# Patient Record
Sex: FEMALE | Race: BLACK OR AFRICAN AMERICAN | NOT HISPANIC OR LATINO | URBAN - METROPOLITAN AREA
[De-identification: names, ages, dates, MRNs, and addresses within clinical notes are randomized per-mention and may not be internally consistent; named-entity substitution may affect disease eponyms.]

---

## 2020-08-19 ENCOUNTER — OFFICE (OUTPATIENT)
Dept: URBAN - METROPOLITAN AREA CLINIC 11 | Facility: CLINIC | Age: 40
End: 2020-08-19
Payer: COMMERCIAL

## 2020-08-19 VITALS
OXYGEN SATURATION: 91 % | HEART RATE: 102 BPM | HEIGHT: 69 IN | WEIGHT: 179 LBS | SYSTOLIC BLOOD PRESSURE: 138 MMHG | DIASTOLIC BLOOD PRESSURE: 73 MMHG

## 2020-08-19 DIAGNOSIS — K74.60 UNSPECIFIED CIRRHOSIS OF LIVER: ICD-10-CM

## 2020-08-19 DIAGNOSIS — R17 UNSPECIFIED JAUNDICE: ICD-10-CM

## 2020-08-19 DIAGNOSIS — R10.84 GENERALIZED ABDOMINAL PAIN: ICD-10-CM

## 2020-08-19 DIAGNOSIS — R14.0 ABDOMINAL DISTENSION (GASEOUS): ICD-10-CM

## 2020-08-19 DIAGNOSIS — R18.8 OTHER ASCITES: ICD-10-CM

## 2020-08-19 LAB
AFP, SERUM, TUMOR MARKER: 13.6 NG/ML — HIGH (ref 0–8.3)
CBC, PLATELET, NO DIFFERENTIAL: HEMATOCRIT: 25 % — LOW (ref 34–46.6)
CBC, PLATELET, NO DIFFERENTIAL: HEMOGLOBIN: 9 G/DL — LOW (ref 11.1–15.9)
CBC, PLATELET, NO DIFFERENTIAL: MCH: 37.8 PG — HIGH (ref 26.6–33)
CBC, PLATELET, NO DIFFERENTIAL: MCHC: 36 G/DL — HIGH (ref 31.5–35.7)
CBC, PLATELET, NO DIFFERENTIAL: MCV: 105 FL — HIGH (ref 79–97)
CBC, PLATELET, NO DIFFERENTIAL: PLATELETS: 145 X10E3/UL — LOW (ref 150–450)
CBC, PLATELET, NO DIFFERENTIAL: RBC: 2.38 X10E6/UL — CRITICAL LOW (ref 3.77–5.28)
CBC, PLATELET, NO DIFFERENTIAL: RDW: 13.1 % (ref 11.7–15.4)
CBC, PLATELET, NO DIFFERENTIAL: WBC: 9.1 X10E3/UL (ref 3.4–10.8)
COMP. METABOLIC PANEL (14): A/G RATIO: 0.6 — LOW (ref 1.2–2.2)
COMP. METABOLIC PANEL (14): ALBUMIN: 2.8 G/DL — LOW (ref 3.8–4.8)
COMP. METABOLIC PANEL (14): ALKALINE PHOSPHATASE: 172 IU/L — HIGH (ref 39–117)
COMP. METABOLIC PANEL (14): ALT (SGPT): 82 IU/L — HIGH (ref 0–32)
COMP. METABOLIC PANEL (14): AST (SGOT): 220 IU/L — HIGH (ref 0–40)
COMP. METABOLIC PANEL (14): BILIRUBIN, TOTAL: 16.6 MG/DL — CRITICAL HIGH (ref 0–1.2)
COMP. METABOLIC PANEL (14): BUN/CREATININE RATIO: 21 (ref 9–23)
COMP. METABOLIC PANEL (14): BUN: 24 MG/DL (ref 6–24)
COMP. METABOLIC PANEL (14): CALCIUM: 9 MG/DL (ref 8.7–10.2)
COMP. METABOLIC PANEL (14): CARBON DIOXIDE, TOTAL: 17 MMOL/L — LOW (ref 20–29)
COMP. METABOLIC PANEL (14): CHLORIDE: 101 MMOL/L (ref 96–106)
COMP. METABOLIC PANEL (14): CREATININE: 1.15 MG/DL — HIGH (ref 0.57–1)
COMP. METABOLIC PANEL (14): EGFR IF AFRICN AM: 68 ML/MIN/1.73 (ref 59–?)
COMP. METABOLIC PANEL (14): EGFR IF NONAFRICN AM: 59 ML/MIN/1.73 — LOW (ref 59–?)
COMP. METABOLIC PANEL (14): GLOBULIN, TOTAL: 4.7 G/DL — HIGH (ref 1.5–4.5)
COMP. METABOLIC PANEL (14): GLUCOSE: 101 MG/DL — HIGH (ref 65–99)
COMP. METABOLIC PANEL (14): POTASSIUM: 4.3 MMOL/L (ref 3.5–5.2)
COMP. METABOLIC PANEL (14): PROTEIN, TOTAL: 7.5 G/DL (ref 6–8.5)
COMP. METABOLIC PANEL (14): SODIUM: 135 MMOL/L (ref 134–144)
PROTHROMBIN TIME (PT): INR: 1.5 — HIGH (ref 0.8–1.2)
PROTHROMBIN TIME (PT): PROTHROMBIN TIME: 15.9 SEC — HIGH (ref 9.1–12)

## 2020-08-19 PROCEDURE — 99204 OFFICE O/P NEW MOD 45 MIN: CPT | Performed by: INTERNAL MEDICINE

## 2020-08-19 NOTE — SERVICEHPINOTES
Ms. Arroyo is a 41-year-old female here for evaluation of decompensated cirrhosis. Patient states that she was just recently in [facility] and has been previously followed by the [facility] GI group because of her liver disease. She was told that her liver disease was related to extensive alcohol use. She apparently was in the hospital in October 2019 and at that time had an EGD by Dr. Montanez which only revealed Candida esophagitis and portal hypertensive gastropathy with no varices. Stomach biopsies were unremarkable. She also had an EGD in 2016 which revealed some esophagitis and gastric ulcer. Biopsies of the ulcer were unremarkable. She admits to drinking heavily up until the end of July 2020 and presented to the hospital because of increased abdominal distention, abdominal pain, and jaundice. We do not have records at this time. She states that she was started on Bumex in the hospital and also given spironolactone which she was already taking. She denied any evidence of any bleeding. She did have some mild confusion and elevated ammonia and was given lactulose. She also states they check for ascites but there is not enough ascites for paracentesis to be performed. She denies being given any prednisone. She states that since her hospitalization she has been doing about the same. She continues to have some generalized abdominal discomfort. She denies any problems with confusion. She denies any fevers or chills. She continues to have jaundice. She states that she has some leg swelling as well. She has never been evaluated for liver transplant and states that she has drank heavily off and on with her last use of alcohol last week of July 2020.

## 2020-08-19 NOTE — SERVICENOTES
I had a long discussion with the patient in regard to her liver cirrhosis and likely alcohol hepatitis.  I explained to her that she will likely have further decompensation which may lead to her death if she continues drink alcohol.  The patient agrees and states that she plans to no longer drink any alcohol.  She is interested in transplant hepatology evaluation, however I told her that she would not be a candidate at the time being but hopefully by the time she gets in to see them she will have remained sober.  She does not appear to have any evidence of any significant ascites at this time.  We will continue her current diuretics and check blood work.  If she does have worsening of her labs or evidence of further decompensation I do recommend she go back to the emergency department.

ADDENDUM: I was able to review some records from [facility].  She was admitted at the end of July and discharged on August 5th.  At that time her meld was 28 due to an INR 1.8, sodium 134, bilirubin 23.8.  Her albumin was low at 2.2.  AST was 127. ALT 51. Alk-phos 177. Imaging in the hospital did reveal normal abdominal ultrasound with to little fluid for paracentesis.  Her ammonia was low.  There was evidence of some gallbladder sludge and stones with some mild gallbladder distention but overall findings were nonspecific given her cirrhosis.  Of note, she was not having any right upper quadrant pain during our evaluation.  CT scan of the abdomen and pelvis with contrast did reveal some mild colonic wall thickening likely related to her cirrhosis.  She is not having any diarrhea.  No other procedures performed during this hospitalization.  We will need to obtain further older records to determine her cirrhosis evaluation and hepatitis evaluation as well.

## 2020-08-27 ENCOUNTER — OFFICE (OUTPATIENT)
Dept: URBAN - METROPOLITAN AREA CLINIC 11 | Facility: CLINIC | Age: 40
End: 2020-08-27

## 2020-09-08 ENCOUNTER — OFFICE (OUTPATIENT)
Dept: URBAN - METROPOLITAN AREA CLINIC 11 | Facility: CLINIC | Age: 40
End: 2020-09-08

## 2020-10-28 ENCOUNTER — OFFICE (OUTPATIENT)
Dept: URBAN - METROPOLITAN AREA CLINIC 11 | Facility: CLINIC | Age: 40
End: 2020-10-28
Payer: COMMERCIAL

## 2020-10-28 VITALS
DIASTOLIC BLOOD PRESSURE: 66 MMHG | WEIGHT: 189 LBS | HEIGHT: 69 IN | OXYGEN SATURATION: 99 % | HEART RATE: 103 BPM | SYSTOLIC BLOOD PRESSURE: 127 MMHG

## 2020-10-28 DIAGNOSIS — R77.2 ABNORMALITY OF ALPHAFETOPROTEIN: ICD-10-CM

## 2020-10-28 DIAGNOSIS — R17 UNSPECIFIED JAUNDICE: ICD-10-CM

## 2020-10-28 DIAGNOSIS — R18.8 OTHER ASCITES: ICD-10-CM

## 2020-10-28 DIAGNOSIS — K74.60 UNSPECIFIED CIRRHOSIS OF LIVER: ICD-10-CM

## 2020-10-28 LAB
AFP, SERUM, TUMOR MARKER: 7.6 NG/ML (ref 0–8.3)
CBC, PLATELET, NO DIFFERENTIAL: HEMATOCRIT: 26.9 % — LOW (ref 34–46.6)
CBC, PLATELET, NO DIFFERENTIAL: HEMOGLOBIN: 9.3 G/DL — LOW (ref 11.1–15.9)
CBC, PLATELET, NO DIFFERENTIAL: MCH: 35.8 PG — HIGH (ref 26.6–33)
CBC, PLATELET, NO DIFFERENTIAL: MCHC: 34.6 G/DL (ref 31.5–35.7)
CBC, PLATELET, NO DIFFERENTIAL: MCV: 104 FL — HIGH (ref 79–97)
CBC, PLATELET, NO DIFFERENTIAL: PLATELETS: 81 X10E3/UL — CRITICAL LOW (ref 150–450)
CBC, PLATELET, NO DIFFERENTIAL: RBC: 2.6 X10E6/UL — CRITICAL LOW (ref 3.77–5.28)
CBC, PLATELET, NO DIFFERENTIAL: RDW: 14.2 % (ref 11.7–15.4)
CBC, PLATELET, NO DIFFERENTIAL: WBC: 4 X10E3/UL (ref 3.4–10.8)
COMP. METABOLIC PANEL (14): A/G RATIO: 0.8 — LOW (ref 1.2–2.2)
COMP. METABOLIC PANEL (14): ALBUMIN: 3.3 G/DL — LOW (ref 3.8–4.8)
COMP. METABOLIC PANEL (14): ALKALINE PHOSPHATASE: 139 IU/L — HIGH (ref 39–117)
COMP. METABOLIC PANEL (14): ALT (SGPT): 25 IU/L (ref 0–32)
COMP. METABOLIC PANEL (14): AST (SGOT): 58 IU/L — HIGH (ref 0–40)
COMP. METABOLIC PANEL (14): BILIRUBIN, TOTAL: 2.7 MG/DL — HIGH (ref 0–1.2)
COMP. METABOLIC PANEL (14): BUN/CREATININE RATIO: 12 (ref 9–23)
COMP. METABOLIC PANEL (14): BUN: 9 MG/DL (ref 6–24)
COMP. METABOLIC PANEL (14): CALCIUM: 9.1 MG/DL (ref 8.7–10.2)
COMP. METABOLIC PANEL (14): CARBON DIOXIDE, TOTAL: 26 MMOL/L (ref 20–29)
COMP. METABOLIC PANEL (14): CHLORIDE: 101 MMOL/L (ref 96–106)
COMP. METABOLIC PANEL (14): CREATININE: 0.76 MG/DL (ref 0.57–1)
COMP. METABOLIC PANEL (14): EGFR IF AFRICN AM: 112 ML/MIN/1.73 (ref 59–?)
COMP. METABOLIC PANEL (14): EGFR IF NONAFRICN AM: 97 ML/MIN/1.73 (ref 59–?)
COMP. METABOLIC PANEL (14): GLOBULIN, TOTAL: 4 G/DL (ref 1.5–4.5)
COMP. METABOLIC PANEL (14): GLUCOSE: 99 MG/DL (ref 65–99)
COMP. METABOLIC PANEL (14): POTASSIUM: 3.8 MMOL/L (ref 3.5–5.2)
COMP. METABOLIC PANEL (14): PROTEIN, TOTAL: 7.3 G/DL (ref 6–8.5)
COMP. METABOLIC PANEL (14): SODIUM: 137 MMOL/L (ref 134–144)
HEMATOLOGY COMMENTS: (no result)
PROTHROMBIN TIME (PT): INR: 1.3 — HIGH (ref 0.9–1.2)
PROTHROMBIN TIME (PT): PROTHROMBIN TIME: 13.8 SEC — HIGH (ref 9.1–12)

## 2020-10-28 PROCEDURE — 99214 OFFICE O/P EST MOD 30 MIN: CPT

## 2020-10-28 NOTE — SERVICEHPINOTES
Ms. Arroyo is a 40-year-old female here for follow-up for alcoholic cirrhosis, elevated AFP, and ascites. The patient was seen by Dr. Riojas 09/15/2020. Her meld 09/08/2020 was 18. When seen by Dr. Riojas, her AFP was still elevated at 11.4 but thought to be due to inflammation. Overall, the patient states that she is doing well. The patient reported that she did have an episode of nausea approximately 2 weeks ago but this was self-limited and she has not had any symptoms since. The patient denies any further ascites, although she does have bilateral lower leg trace edema. The patient states that she did not take her diuretics as directed last week and now she does have some additional edema. The patient reports that she also had a relapse and did drink over a bottle of wine when she met up with some friends recently. The patient reports that she has not had a drink since. The patient also reported having some recent labs drawn by her primary care provider, we will request these labs. The patient denies any pruritus, confusion, jaundice, vomiting, anorexia, or unintentional weight loss. The patient denies any excessive bruising or bleeding. No asterixis or palmar erythema was noted. The patient did have an EGD performed 10/30/2019 that did not reveal any varices.

## 2020-10-29 LAB
AFP, SERUM, TUMOR MARKER: 7.6 NG/ML (ref 0–8.3)
CBC, PLATELET, NO DIFFERENTIAL: HEMATOCRIT: 26.9 % — LOW (ref 34–46.6)
CBC, PLATELET, NO DIFFERENTIAL: HEMATOLOGY COMMENTS: (no result)
CBC, PLATELET, NO DIFFERENTIAL: HEMOGLOBIN: 9.3 G/DL — LOW (ref 11.1–15.9)
CBC, PLATELET, NO DIFFERENTIAL: MCH: 35.8 PG — HIGH (ref 26.6–33)
CBC, PLATELET, NO DIFFERENTIAL: MCHC: 34.6 G/DL (ref 31.5–35.7)
CBC, PLATELET, NO DIFFERENTIAL: MCV: 104 FL — HIGH (ref 79–97)
CBC, PLATELET, NO DIFFERENTIAL: NRBC: (no result)
CBC, PLATELET, NO DIFFERENTIAL: PLATELETS: 81 X10E3/UL — CRITICAL LOW (ref 150–450)
CBC, PLATELET, NO DIFFERENTIAL: RBC: 2.6 X10E6/UL — CRITICAL LOW (ref 3.77–5.28)
CBC, PLATELET, NO DIFFERENTIAL: RDW: 14.2 % (ref 11.7–15.4)
CBC, PLATELET, NO DIFFERENTIAL: WBC: 4 X10E3/UL (ref 3.4–10.8)
COMP. METABOLIC PANEL (14): A/G RATIO: 0.8 — LOW (ref 1.2–2.2)
COMP. METABOLIC PANEL (14): ALBUMIN: 3.3 G/DL — LOW (ref 3.8–4.8)
COMP. METABOLIC PANEL (14): ALKALINE PHOSPHATASE: 139 IU/L — HIGH (ref 39–117)
COMP. METABOLIC PANEL (14): ALT (SGPT): 25 IU/L (ref 0–32)
COMP. METABOLIC PANEL (14): AST (SGOT): 58 IU/L — HIGH (ref 0–40)
COMP. METABOLIC PANEL (14): BILIRUBIN, TOTAL: 2.7 MG/DL — HIGH (ref 0–1.2)
COMP. METABOLIC PANEL (14): BUN/CREATININE RATIO: 12 (ref 9–23)
COMP. METABOLIC PANEL (14): BUN: 9 MG/DL (ref 6–24)
COMP. METABOLIC PANEL (14): CALCIUM: 9.1 MG/DL (ref 8.7–10.2)
COMP. METABOLIC PANEL (14): CARBON DIOXIDE, TOTAL: 26 MMOL/L (ref 20–29)
COMP. METABOLIC PANEL (14): CHLORIDE: 101 MMOL/L (ref 96–106)
COMP. METABOLIC PANEL (14): CREATININE: 0.76 MG/DL (ref 0.57–1)
COMP. METABOLIC PANEL (14): EGFR IF AFRICN AM: 112 ML/MIN/1.73 (ref 59–?)
COMP. METABOLIC PANEL (14): EGFR IF NONAFRICN AM: 97 ML/MIN/1.73 (ref 59–?)
COMP. METABOLIC PANEL (14): GLOBULIN, TOTAL: 4 G/DL (ref 1.5–4.5)
COMP. METABOLIC PANEL (14): GLUCOSE: 99 MG/DL (ref 65–99)
COMP. METABOLIC PANEL (14): POTASSIUM: 3.8 MMOL/L (ref 3.5–5.2)
COMP. METABOLIC PANEL (14): PROTEIN, TOTAL: 7.3 G/DL (ref 6–8.5)
COMP. METABOLIC PANEL (14): SODIUM: 137 MMOL/L (ref 134–144)
PROTHROMBIN TIME (PT): INR: 1.3 — HIGH (ref 0.9–1.2)
PROTHROMBIN TIME (PT): PROTHROMBIN TIME: 13.8 SEC — HIGH (ref 9.1–12)

## 2021-02-10 ENCOUNTER — OFFICE (OUTPATIENT)
Dept: URBAN - METROPOLITAN AREA CLINIC 11 | Facility: CLINIC | Age: 41
End: 2021-02-10

## 2021-02-10 VITALS
SYSTOLIC BLOOD PRESSURE: 125 MMHG | DIASTOLIC BLOOD PRESSURE: 70 MMHG | WEIGHT: 191 LBS | HEIGHT: 69 IN | HEART RATE: 90 BPM | OXYGEN SATURATION: 100 %

## 2021-02-10 DIAGNOSIS — K74.60 UNSPECIFIED CIRRHOSIS OF LIVER: ICD-10-CM

## 2021-02-10 DIAGNOSIS — D64.9 ANEMIA, UNSPECIFIED: ICD-10-CM

## 2021-02-10 DIAGNOSIS — F10.10 ALCOHOL ABUSE, UNCOMPLICATED: ICD-10-CM

## 2021-02-10 DIAGNOSIS — R18.8 OTHER ASCITES: ICD-10-CM

## 2021-02-10 LAB
AFP, SERUM, TUMOR MARKER: 7.2 NG/ML (ref 0–8.3)
CBC, PLATELET, NO DIFFERENTIAL: HEMATOCRIT: 30.2 % — LOW (ref 34–46.6)
CBC, PLATELET, NO DIFFERENTIAL: HEMOGLOBIN: 10.1 G/DL — LOW (ref 11.1–15.9)
CBC, PLATELET, NO DIFFERENTIAL: MCH: 33.2 PG — HIGH (ref 26.6–33)
CBC, PLATELET, NO DIFFERENTIAL: MCHC: 33.4 G/DL (ref 31.5–35.7)
CBC, PLATELET, NO DIFFERENTIAL: MCV: 99 FL — HIGH (ref 79–97)
CBC, PLATELET, NO DIFFERENTIAL: PLATELETS: 107 X10E3/UL — LOW (ref 150–450)
CBC, PLATELET, NO DIFFERENTIAL: RBC: 3.04 X10E6/UL — LOW (ref 3.77–5.28)
CBC, PLATELET, NO DIFFERENTIAL: RDW: 17.5 % — HIGH (ref 11.7–15.4)
CBC, PLATELET, NO DIFFERENTIAL: WBC: 4.6 X10E3/UL (ref 3.4–10.8)
COMP. METABOLIC PANEL (14): A/G RATIO: 1.1 — LOW (ref 1.2–2.2)
COMP. METABOLIC PANEL (14): ALBUMIN: 3.7 G/DL — LOW (ref 3.8–4.8)
COMP. METABOLIC PANEL (14): ALKALINE PHOSPHATASE: 124 IU/L — HIGH (ref 39–117)
COMP. METABOLIC PANEL (14): ALT (SGPT): 16 IU/L (ref 0–32)
COMP. METABOLIC PANEL (14): AST (SGOT): 32 IU/L (ref 0–40)
COMP. METABOLIC PANEL (14): BILIRUBIN, TOTAL: 0.9 MG/DL (ref 0–1.2)
COMP. METABOLIC PANEL (14): BUN/CREATININE RATIO: 13 (ref 9–23)
COMP. METABOLIC PANEL (14): BUN: 11 MG/DL (ref 6–24)
COMP. METABOLIC PANEL (14): CALCIUM: 9.2 MG/DL (ref 8.7–10.2)
COMP. METABOLIC PANEL (14): CARBON DIOXIDE, TOTAL: 22 MMOL/L (ref 20–29)
COMP. METABOLIC PANEL (14): CHLORIDE: 105 MMOL/L (ref 96–106)
COMP. METABOLIC PANEL (14): CREATININE: 0.87 MG/DL (ref 0.57–1)
COMP. METABOLIC PANEL (14): EGFR IF AFRICN AM: 95 ML/MIN/1.73 (ref 59–?)
COMP. METABOLIC PANEL (14): EGFR IF NONAFRICN AM: 82 ML/MIN/1.73 (ref 59–?)
COMP. METABOLIC PANEL (14): GLOBULIN, TOTAL: 3.3 G/DL (ref 1.5–4.5)
COMP. METABOLIC PANEL (14): GLUCOSE: 106 MG/DL — HIGH (ref 65–99)
COMP. METABOLIC PANEL (14): POTASSIUM: 3.6 MMOL/L (ref 3.5–5.2)
COMP. METABOLIC PANEL (14): PROTEIN, TOTAL: 7 G/DL (ref 6–8.5)
COMP. METABOLIC PANEL (14): SODIUM: 140 MMOL/L (ref 134–144)
FE+TIBC+FER: FERRITIN, SERUM: 91 NG/ML (ref 15–150)
FE+TIBC+FER: IRON BIND.CAP.(TIBC): 344 UG/DL (ref 250–450)
FE+TIBC+FER: IRON SATURATION: 18 % (ref 15–55)
FE+TIBC+FER: IRON: 62 UG/DL (ref 27–159)
FE+TIBC+FER: UIBC: 282 UG/DL (ref 131–425)
PROTHROMBIN TIME (PT): INR: 1.2 (ref 0.9–1.2)
PROTHROMBIN TIME (PT): PROTHROMBIN TIME: 12.3 SEC — HIGH (ref 9.1–12)
VITAMIN B12 AND FOLATE: FOLATE (FOLIC ACID), SERUM: 19.3 NG/ML (ref 3–?)
VITAMIN B12 AND FOLATE: VITAMIN B12: 616 PG/ML (ref 232–1245)

## 2021-02-10 PROCEDURE — 99214 OFFICE O/P EST MOD 30 MIN: CPT | Performed by: INTERNAL MEDICINE

## 2021-02-26 ENCOUNTER — OFFICE (OUTPATIENT)
Dept: URBAN - METROPOLITAN AREA CLINIC 11 | Facility: CLINIC | Age: 41
End: 2021-02-26

## 2021-05-31 NOTE — SERVICENOTES
The patient was seen along with Kiesha Rodrigues NP.  I have evaluated the patient, discussed with NP, and agree with the above documented findings, impression, and plan of care.-ALEXIA
Respiratory

## 2021-09-11 ENCOUNTER — INPATIENT HOSPITAL (OUTPATIENT)
Dept: URBAN - METROPOLITAN AREA HOSPITAL 124 | Facility: HOSPITAL | Age: 41
End: 2021-09-11
Payer: COMMERCIAL

## 2021-09-11 DIAGNOSIS — K70.30 ALCOHOLIC CIRRHOSIS OF LIVER WITHOUT ASCITES: ICD-10-CM

## 2021-09-11 DIAGNOSIS — D64.9 ANEMIA, UNSPECIFIED: ICD-10-CM

## 2021-09-11 DIAGNOSIS — I85.00 ESOPHAGEAL VARICES WITHOUT BLEEDING: ICD-10-CM

## 2021-09-11 DIAGNOSIS — R18.8 OTHER ASCITES: ICD-10-CM

## 2021-09-11 PROCEDURE — 99222 1ST HOSP IP/OBS MODERATE 55: CPT | Performed by: INTERNAL MEDICINE

## 2021-09-12 ENCOUNTER — INPATIENT HOSPITAL (OUTPATIENT)
Dept: URBAN - METROPOLITAN AREA HOSPITAL 124 | Facility: HOSPITAL | Age: 41
End: 2021-09-12
Payer: COMMERCIAL

## 2021-09-12 DIAGNOSIS — I85.00 ESOPHAGEAL VARICES WITHOUT BLEEDING: ICD-10-CM

## 2021-09-12 DIAGNOSIS — K70.30 ALCOHOLIC CIRRHOSIS OF LIVER WITHOUT ASCITES: ICD-10-CM

## 2021-09-12 DIAGNOSIS — R18.8 OTHER ASCITES: ICD-10-CM

## 2021-09-12 DIAGNOSIS — D64.9 ANEMIA, UNSPECIFIED: ICD-10-CM

## 2021-09-12 PROCEDURE — 99232 SBSQ HOSP IP/OBS MODERATE 35: CPT | Performed by: INTERNAL MEDICINE

## 2021-09-13 ENCOUNTER — INPATIENT HOSPITAL (OUTPATIENT)
Dept: URBAN - METROPOLITAN AREA HOSPITAL 124 | Facility: HOSPITAL | Age: 41
End: 2021-09-13
Payer: COMMERCIAL

## 2021-09-13 DIAGNOSIS — K70.30 ALCOHOLIC CIRRHOSIS OF LIVER WITHOUT ASCITES: ICD-10-CM

## 2021-09-13 DIAGNOSIS — D64.9 ANEMIA, UNSPECIFIED: ICD-10-CM

## 2021-09-13 DIAGNOSIS — K29.50 UNSPECIFIED CHRONIC GASTRITIS WITHOUT BLEEDING: ICD-10-CM

## 2021-09-13 DIAGNOSIS — I85.00 ESOPHAGEAL VARICES WITHOUT BLEEDING: ICD-10-CM

## 2021-09-13 PROCEDURE — 43239 EGD BIOPSY SINGLE/MULTIPLE: CPT | Performed by: INTERNAL MEDICINE

## 2021-09-14 ENCOUNTER — INPATIENT HOSPITAL (OUTPATIENT)
Dept: URBAN - METROPOLITAN AREA HOSPITAL 124 | Facility: HOSPITAL | Age: 41
End: 2021-09-14
Payer: COMMERCIAL

## 2021-09-14 DIAGNOSIS — D64.9 ANEMIA, UNSPECIFIED: ICD-10-CM

## 2021-09-14 DIAGNOSIS — I85.00 ESOPHAGEAL VARICES WITHOUT BLEEDING: ICD-10-CM

## 2021-09-14 DIAGNOSIS — K74.60 UNSPECIFIED CIRRHOSIS OF LIVER: ICD-10-CM

## 2021-09-14 DIAGNOSIS — R18.8 OTHER ASCITES: ICD-10-CM

## 2021-09-14 PROCEDURE — 99232 SBSQ HOSP IP/OBS MODERATE 35: CPT | Performed by: NURSE PRACTITIONER

## 2021-09-15 ENCOUNTER — INPATIENT HOSPITAL (OUTPATIENT)
Dept: URBAN - METROPOLITAN AREA HOSPITAL 124 | Facility: HOSPITAL | Age: 41
End: 2021-09-15
Payer: COMMERCIAL

## 2021-09-15 DIAGNOSIS — K31.89 OTHER DISEASES OF STOMACH AND DUODENUM: ICD-10-CM

## 2021-09-15 DIAGNOSIS — K25.9 GASTRIC ULCER, UNSPECIFIED AS ACUTE OR CHRONIC, WITHOUT HEMO: ICD-10-CM

## 2021-09-15 DIAGNOSIS — D12.4 BENIGN NEOPLASM OF DESCENDING COLON: ICD-10-CM

## 2021-09-15 DIAGNOSIS — K64.2 THIRD DEGREE HEMORRHOIDS: ICD-10-CM

## 2021-09-15 DIAGNOSIS — I85.00 ESOPHAGEAL VARICES WITHOUT BLEEDING: ICD-10-CM

## 2021-09-15 DIAGNOSIS — D12.5 BENIGN NEOPLASM OF SIGMOID COLON: ICD-10-CM

## 2021-09-15 PROCEDURE — 45385 COLONOSCOPY W/LESION REMOVAL: CPT | Performed by: INTERNAL MEDICINE

## 2021-09-15 PROCEDURE — 45330 DIAGNOSTIC SIGMOIDOSCOPY: CPT | Mod: XS | Performed by: INTERNAL MEDICINE

## 2021-09-15 PROCEDURE — 43235 EGD DIAGNOSTIC BRUSH WASH: CPT | Mod: XS | Performed by: INTERNAL MEDICINE

## 2021-09-16 ENCOUNTER — INPATIENT HOSPITAL (OUTPATIENT)
Dept: URBAN - METROPOLITAN AREA HOSPITAL 124 | Facility: HOSPITAL | Age: 41
End: 2021-09-16
Payer: COMMERCIAL

## 2021-09-16 DIAGNOSIS — K74.60 UNSPECIFIED CIRRHOSIS OF LIVER: ICD-10-CM

## 2021-09-16 DIAGNOSIS — K64.2 THIRD DEGREE HEMORRHOIDS: ICD-10-CM

## 2021-09-16 DIAGNOSIS — D64.9 ANEMIA, UNSPECIFIED: ICD-10-CM

## 2021-09-16 DIAGNOSIS — D12.4 BENIGN NEOPLASM OF DESCENDING COLON: ICD-10-CM

## 2021-09-16 DIAGNOSIS — I85.00 ESOPHAGEAL VARICES WITHOUT BLEEDING: ICD-10-CM

## 2021-09-16 PROCEDURE — 99232 SBSQ HOSP IP/OBS MODERATE 35: CPT | Performed by: NURSE PRACTITIONER

## 2021-09-17 ENCOUNTER — INPATIENT HOSPITAL (OUTPATIENT)
Dept: URBAN - METROPOLITAN AREA HOSPITAL 124 | Facility: HOSPITAL | Age: 41
End: 2021-09-17
Payer: COMMERCIAL

## 2021-09-17 DIAGNOSIS — D64.9 ANEMIA, UNSPECIFIED: ICD-10-CM

## 2021-09-17 DIAGNOSIS — R18.8 OTHER ASCITES: ICD-10-CM

## 2021-09-17 DIAGNOSIS — D12.4 BENIGN NEOPLASM OF DESCENDING COLON: ICD-10-CM

## 2021-09-17 DIAGNOSIS — K64.2 THIRD DEGREE HEMORRHOIDS: ICD-10-CM

## 2021-09-17 DIAGNOSIS — K70.30 ALCOHOLIC CIRRHOSIS OF LIVER WITHOUT ASCITES: ICD-10-CM

## 2021-09-17 DIAGNOSIS — I85.00 ESOPHAGEAL VARICES WITHOUT BLEEDING: ICD-10-CM

## 2021-09-17 PROCEDURE — 99231 SBSQ HOSP IP/OBS SF/LOW 25: CPT | Performed by: INTERNAL MEDICINE

## 2021-09-18 ENCOUNTER — INPATIENT HOSPITAL (OUTPATIENT)
Dept: URBAN - METROPOLITAN AREA HOSPITAL 124 | Facility: HOSPITAL | Age: 41
End: 2021-09-18
Payer: COMMERCIAL

## 2021-09-18 DIAGNOSIS — K64.2 THIRD DEGREE HEMORRHOIDS: ICD-10-CM

## 2021-09-18 DIAGNOSIS — D64.9 ANEMIA, UNSPECIFIED: ICD-10-CM

## 2021-09-18 DIAGNOSIS — R11.2 NAUSEA WITH VOMITING, UNSPECIFIED: ICD-10-CM

## 2021-09-18 DIAGNOSIS — D12.4 BENIGN NEOPLASM OF DESCENDING COLON: ICD-10-CM

## 2021-09-18 DIAGNOSIS — R19.7 DIARRHEA, UNSPECIFIED: ICD-10-CM

## 2021-09-18 DIAGNOSIS — K74.60 UNSPECIFIED CIRRHOSIS OF LIVER: ICD-10-CM

## 2021-09-18 PROCEDURE — 99231 SBSQ HOSP IP/OBS SF/LOW 25: CPT | Performed by: INTERNAL MEDICINE

## 2021-09-20 ENCOUNTER — INPATIENT HOSPITAL (OUTPATIENT)
Dept: URBAN - METROPOLITAN AREA HOSPITAL 124 | Facility: HOSPITAL | Age: 41
End: 2021-09-20
Payer: COMMERCIAL

## 2021-09-20 DIAGNOSIS — K74.60 UNSPECIFIED CIRRHOSIS OF LIVER: ICD-10-CM

## 2021-09-20 DIAGNOSIS — D12.4 BENIGN NEOPLASM OF DESCENDING COLON: ICD-10-CM

## 2021-09-20 DIAGNOSIS — D64.9 ANEMIA, UNSPECIFIED: ICD-10-CM

## 2021-09-20 DIAGNOSIS — K64.2 THIRD DEGREE HEMORRHOIDS: ICD-10-CM

## 2021-09-20 DIAGNOSIS — I85.00 ESOPHAGEAL VARICES WITHOUT BLEEDING: ICD-10-CM

## 2021-09-20 DIAGNOSIS — R10.9 UNSPECIFIED ABDOMINAL PAIN: ICD-10-CM

## 2021-09-20 PROCEDURE — 99231 SBSQ HOSP IP/OBS SF/LOW 25: CPT | Performed by: NURSE PRACTITIONER

## 2021-09-21 ENCOUNTER — INPATIENT HOSPITAL (OUTPATIENT)
Dept: URBAN - METROPOLITAN AREA HOSPITAL 124 | Facility: HOSPITAL | Age: 41
End: 2021-09-21
Payer: COMMERCIAL

## 2021-09-21 DIAGNOSIS — R18.8 OTHER ASCITES: ICD-10-CM

## 2021-09-21 DIAGNOSIS — D64.9 ANEMIA, UNSPECIFIED: ICD-10-CM

## 2021-09-21 DIAGNOSIS — K64.2 THIRD DEGREE HEMORRHOIDS: ICD-10-CM

## 2021-09-21 DIAGNOSIS — D12.4 BENIGN NEOPLASM OF DESCENDING COLON: ICD-10-CM

## 2021-09-21 DIAGNOSIS — I85.00 ESOPHAGEAL VARICES WITHOUT BLEEDING: ICD-10-CM

## 2021-09-21 DIAGNOSIS — K74.60 UNSPECIFIED CIRRHOSIS OF LIVER: ICD-10-CM

## 2021-09-21 PROCEDURE — 99231 SBSQ HOSP IP/OBS SF/LOW 25: CPT | Performed by: NURSE PRACTITIONER

## 2021-09-22 ENCOUNTER — INPATIENT HOSPITAL (OUTPATIENT)
Dept: URBAN - METROPOLITAN AREA HOSPITAL 124 | Facility: HOSPITAL | Age: 41
End: 2021-09-22
Payer: COMMERCIAL

## 2021-09-22 DIAGNOSIS — D64.9 ANEMIA, UNSPECIFIED: ICD-10-CM

## 2021-09-22 DIAGNOSIS — I85.00 ESOPHAGEAL VARICES WITHOUT BLEEDING: ICD-10-CM

## 2021-09-22 DIAGNOSIS — D12.4 BENIGN NEOPLASM OF DESCENDING COLON: ICD-10-CM

## 2021-09-22 DIAGNOSIS — K74.60 UNSPECIFIED CIRRHOSIS OF LIVER: ICD-10-CM

## 2021-09-22 DIAGNOSIS — R10.9 UNSPECIFIED ABDOMINAL PAIN: ICD-10-CM

## 2021-09-22 DIAGNOSIS — K64.2 THIRD DEGREE HEMORRHOIDS: ICD-10-CM

## 2021-09-22 PROCEDURE — 99231 SBSQ HOSP IP/OBS SF/LOW 25: CPT | Performed by: NURSE PRACTITIONER

## 2021-09-23 ENCOUNTER — INPATIENT HOSPITAL (OUTPATIENT)
Dept: URBAN - METROPOLITAN AREA HOSPITAL 124 | Facility: HOSPITAL | Age: 41
End: 2021-09-23
Payer: COMMERCIAL

## 2021-09-23 DIAGNOSIS — I85.00 ESOPHAGEAL VARICES WITHOUT BLEEDING: ICD-10-CM

## 2021-09-23 DIAGNOSIS — D64.9 ANEMIA, UNSPECIFIED: ICD-10-CM

## 2021-09-23 DIAGNOSIS — R18.8 OTHER ASCITES: ICD-10-CM

## 2021-09-23 DIAGNOSIS — K70.30 ALCOHOLIC CIRRHOSIS OF LIVER WITHOUT ASCITES: ICD-10-CM

## 2021-09-23 DIAGNOSIS — K64.2 THIRD DEGREE HEMORRHOIDS: ICD-10-CM

## 2021-09-23 DIAGNOSIS — D12.4 BENIGN NEOPLASM OF DESCENDING COLON: ICD-10-CM

## 2021-09-23 PROCEDURE — 99231 SBSQ HOSP IP/OBS SF/LOW 25: CPT | Performed by: INTERNAL MEDICINE

## 2021-10-11 ENCOUNTER — OFFICE (OUTPATIENT)
Dept: URBAN - METROPOLITAN AREA CLINIC 11 | Facility: CLINIC | Age: 41
End: 2021-10-11
Payer: COMMERCIAL

## 2021-10-11 VITALS
WEIGHT: 184 LBS | HEART RATE: 71 BPM | SYSTOLIC BLOOD PRESSURE: 130 MMHG | DIASTOLIC BLOOD PRESSURE: 70 MMHG | HEIGHT: 69 IN | OXYGEN SATURATION: 94 %

## 2021-10-11 DIAGNOSIS — D64.9 ANEMIA, UNSPECIFIED: ICD-10-CM

## 2021-10-11 DIAGNOSIS — R18.8 OTHER ASCITES: ICD-10-CM

## 2021-10-11 DIAGNOSIS — R14.0 ABDOMINAL DISTENSION (GASEOUS): ICD-10-CM

## 2021-10-11 DIAGNOSIS — R77.2 ABNORMALITY OF ALPHAFETOPROTEIN: ICD-10-CM

## 2021-10-11 DIAGNOSIS — K70.30 ALCOHOLIC CIRRHOSIS OF LIVER WITHOUT ASCITES: ICD-10-CM

## 2021-10-11 LAB
AFP, SERUM, TUMOR MARKER: 6.2 NG/ML (ref 0–8.3)
CBC, PLATELET, NO DIFFERENTIAL: HEMATOCRIT: 27.2 % — LOW (ref 34–46.6)
CBC, PLATELET, NO DIFFERENTIAL: HEMOGLOBIN: 8.5 G/DL — LOW (ref 11.1–15.9)
CBC, PLATELET, NO DIFFERENTIAL: MCH: 30.8 PG (ref 26.6–33)
CBC, PLATELET, NO DIFFERENTIAL: MCHC: 31.3 G/DL — LOW (ref 31.5–35.7)
CBC, PLATELET, NO DIFFERENTIAL: MCV: 99 FL — HIGH (ref 79–97)
CBC, PLATELET, NO DIFFERENTIAL: PLATELETS: 109 X10E3/UL — LOW (ref 150–450)
CBC, PLATELET, NO DIFFERENTIAL: RBC: 2.76 X10E6/UL — LOW (ref 3.77–5.28)
CBC, PLATELET, NO DIFFERENTIAL: RDW: 19.8 % — HIGH (ref 11.7–15.4)
CBC, PLATELET, NO DIFFERENTIAL: WBC: 4.2 X10E3/UL (ref 3.4–10.8)
COMP. METABOLIC PANEL (14): A/G RATIO: 1.1 — LOW (ref 1.2–2.2)
COMP. METABOLIC PANEL (14): ALBUMIN: 3.9 G/DL (ref 3.8–4.8)
COMP. METABOLIC PANEL (14): ALKALINE PHOSPHATASE: 131 IU/L — HIGH (ref 44–121)
COMP. METABOLIC PANEL (14): ALT (SGPT): 10 IU/L (ref 0–32)
COMP. METABOLIC PANEL (14): AST (SGOT): 31 IU/L (ref 0–40)
COMP. METABOLIC PANEL (14): BILIRUBIN, TOTAL: 1.8 MG/DL — HIGH (ref 0–1.2)
COMP. METABOLIC PANEL (14): BUN/CREATININE RATIO: 10 (ref 9–23)
COMP. METABOLIC PANEL (14): BUN: 12 MG/DL (ref 6–24)
COMP. METABOLIC PANEL (14): CALCIUM: 9.4 MG/DL (ref 8.7–10.2)
COMP. METABOLIC PANEL (14): CARBON DIOXIDE, TOTAL: 25 MMOL/L (ref 20–29)
COMP. METABOLIC PANEL (14): CHLORIDE: 105 MMOL/L (ref 96–106)
COMP. METABOLIC PANEL (14): CREATININE: 1.25 MG/DL — HIGH (ref 0.57–1)
COMP. METABOLIC PANEL (14): EGFR IF AFRICN AM: 61 ML/MIN/1.73 (ref 59–?)
COMP. METABOLIC PANEL (14): EGFR IF NONAFRICN AM: 53 ML/MIN/1.73 — LOW (ref 59–?)
COMP. METABOLIC PANEL (14): GLOBULIN, TOTAL: 3.6 G/DL (ref 1.5–4.5)
COMP. METABOLIC PANEL (14): GLUCOSE: 87 MG/DL (ref 65–99)
COMP. METABOLIC PANEL (14): POTASSIUM: 3.1 MMOL/L — LOW (ref 3.5–5.2)
COMP. METABOLIC PANEL (14): PROTEIN, TOTAL: 7.5 G/DL (ref 6–8.5)
COMP. METABOLIC PANEL (14): SODIUM: 145 MMOL/L — HIGH (ref 134–144)
PROTHROMBIN TIME (PT): INR: 1.1 (ref 0.9–1.2)
PROTHROMBIN TIME (PT): PROTHROMBIN TIME: 12 SEC (ref 9.1–12)

## 2021-10-11 PROCEDURE — 99214 OFFICE O/P EST MOD 30 MIN: CPT | Performed by: INTERNAL MEDICINE

## 2021-10-11 NOTE — SERVICEHPINOTES
Ms. Arroyo is a 40-year-old female here for follow-up of cirrhosis. She has not been seen since follow-up in February 2021. At that time her meld score was eight and she did have some mild normocytic anemia with hematocrit 30. We did recommend EGD and colonoscopy at that time but she never followed through. She was admitted to the hospital at [facility] in September 2021 because of severe anemia. She underwent EGD which revealed grade 2-3 esophageal varices with no stigmata of bleeding there is also portal hypertensive gastropathy but no definite gastric varices. There was a 1.5 cm clean based ulcer in the antrum. Biopsies of this were unremarkable and negative for H pylori. She then underwent colonoscopy in the hospital where she was found to have a large 2 cm sigmoid polyp which had some post polypectomy bleeding during the procedure and so underwent clipping and injection of epinephrine.. It is noted that the polyp was actually bleeding prior to polypectomy and was quite friable. There was another polyp in the sigmoid colon which was not removed. While in the hospital she had an episode of some hematemesis and passage of some blood clots and so underwent repeat EGD in flexible sigmoidoscopy where she had no evidence of any GI bleeding but there was some blood in the oropharynx consistent with possible nasal source. Flexible sigmoidoscopy revealed the polypectomy site was not bleeding with hemo clips still in place. She was then discharged and comes in for follow-up today. She denies any fevers or chills. She has had some mild weakness. She has not had any further bleeding. She is scheduled to see [facility] hepatology next week. She does note some increase leg swelling and was seen by Nephrology in the hospital. She denies any confusion. She does admit that she had another relapse of alcohol use in July when she lost her job. She has not had any since July 2021.  Previous GI History:
br The patient was initially seen by me in August 2020 after being discharged from [facility]. She was previously followed by the [facility] group. She does have a history of heavy alcohol use and was in the hospital because of alcohol hepatitis prior to seeing me. She did well over that timeframe with abstinence and our last blood work in October 2020 revealed significant improvement in her liver enzymes. Her ascites also improved with diuretics. She does have microcytic anemia which has been stable. When we initially saw her she had mild elevation of AFP at 13.6 but with resolution of her hepatitis this came back to normal. She was seen by Dr. Riojas at [facility] for transplant evaluation. Since then the patient moved to [place], [place] and relapsed on alcohol use. She relapsed in January 2021. She was taken to the ED at [facility]. she did undergo inpatient rehab after her first relapse in January but then was admitted to [place] after her second relapse.

## 2021-10-11 NOTE — SERVICENOTES
The patient was in the hospital because of some significant anemia which may be related to large colon polyp.  We will need to have her undergo repeat colonoscopy sometime in the next 6-12 months.  The meantime we will plan on having her basic blood work checked to ensure no worsening of anemia.  She should continue her diuretics and continue on low-salt diet for now.  She is scheduled to see hepatology next week and I did recommend she continue to see them as she likely will need liver transplant at some point.  We also had a long discussion regarding the need to refrain from alcohol use.  If she does have recurrence of significant anemia then she may need a repeat colonoscopy or capsule endoscopy.  She is currently on propranolol for esophageal varices which she takes twice a day.  Her pulse is not quite at goal so we may need to increase this pending blood work

## 2022-02-25 ENCOUNTER — OFFICE (OUTPATIENT)
Dept: URBAN - METROPOLITAN AREA CLINIC 11 | Facility: CLINIC | Age: 42
End: 2022-02-25

## 2022-02-25 PROCEDURE — G9199 DOC REASON FOR NO VTE: HCPCS

## 2022-08-30 ENCOUNTER — OFFICE (OUTPATIENT)
Dept: URBAN - METROPOLITAN AREA CLINIC 12 | Facility: CLINIC | Age: 42
End: 2022-08-30
Payer: COMMERCIAL

## 2022-08-30 VITALS
HEART RATE: 80 BPM | SYSTOLIC BLOOD PRESSURE: 125 MMHG | DIASTOLIC BLOOD PRESSURE: 62 MMHG | OXYGEN SATURATION: 88 % | WEIGHT: 209 LBS | HEIGHT: 69 IN

## 2022-08-30 DIAGNOSIS — D64.9 ANEMIA, UNSPECIFIED: ICD-10-CM

## 2022-08-30 DIAGNOSIS — R18.8 OTHER ASCITES: ICD-10-CM

## 2022-08-30 DIAGNOSIS — R77.2 ABNORMALITY OF ALPHAFETOPROTEIN: ICD-10-CM

## 2022-08-30 DIAGNOSIS — K70.30 ALCOHOLIC CIRRHOSIS OF LIVER WITHOUT ASCITES: ICD-10-CM

## 2022-08-30 LAB
AFP, SERUM, TUMOR MARKER: 5.3 NG/ML (ref 0–6.4)
CBC, PLATELET, NO DIFFERENTIAL: HEMATOCRIT: 36.5 % (ref 34–46.6)
CBC, PLATELET, NO DIFFERENTIAL: HEMOGLOBIN: 12.1 G/DL (ref 11.1–15.9)
CBC, PLATELET, NO DIFFERENTIAL: MCH: 29.8 PG (ref 26.6–33)
CBC, PLATELET, NO DIFFERENTIAL: MCHC: 33.2 G/DL (ref 31.5–35.7)
CBC, PLATELET, NO DIFFERENTIAL: MCV: 90 FL (ref 79–97)
CBC, PLATELET, NO DIFFERENTIAL: NRBC: (no result)
CBC, PLATELET, NO DIFFERENTIAL: PLATELETS: 132 X10E3/UL — LOW (ref 150–450)
CBC, PLATELET, NO DIFFERENTIAL: RBC: 4.06 X10E6/UL (ref 3.77–5.28)
CBC, PLATELET, NO DIFFERENTIAL: RDW: 18.9 % — HIGH (ref 11.7–15.4)
CBC, PLATELET, NO DIFFERENTIAL: WBC: 4.7 X10E3/UL (ref 3.4–10.8)
COMP. METABOLIC PANEL (14): A/G RATIO: 1.5 (ref 1.2–2.2)
COMP. METABOLIC PANEL (14): ALBUMIN: 4.1 G/DL (ref 3.8–4.8)
COMP. METABOLIC PANEL (14): ALKALINE PHOSPHATASE: 172 IU/L — HIGH (ref 44–121)
COMP. METABOLIC PANEL (14): ALT (SGPT): 21 IU/L (ref 0–32)
COMP. METABOLIC PANEL (14): AST (SGOT): 30 IU/L (ref 0–40)
COMP. METABOLIC PANEL (14): BILIRUBIN, TOTAL: 0.8 MG/DL (ref 0–1.2)
COMP. METABOLIC PANEL (14): BUN/CREATININE RATIO: 13 (ref 9–23)
COMP. METABOLIC PANEL (14): BUN: 10 MG/DL (ref 6–24)
COMP. METABOLIC PANEL (14): CALCIUM: 9.7 MG/DL (ref 8.7–10.2)
COMP. METABOLIC PANEL (14): CARBON DIOXIDE, TOTAL: 22 MMOL/L (ref 20–29)
COMP. METABOLIC PANEL (14): CHLORIDE: 106 MMOL/L (ref 96–106)
COMP. METABOLIC PANEL (14): CREATININE: 0.78 MG/DL (ref 0.57–1)
COMP. METABOLIC PANEL (14): EGFR: 96 ML/MIN/1.73 (ref 59–?)
COMP. METABOLIC PANEL (14): GLOBULIN, TOTAL: 2.7 G/DL (ref 1.5–4.5)
COMP. METABOLIC PANEL (14): GLUCOSE: 108 MG/DL — HIGH (ref 65–99)
COMP. METABOLIC PANEL (14): POTASSIUM: 4 MMOL/L (ref 3.5–5.2)
COMP. METABOLIC PANEL (14): PROTEIN, TOTAL: 6.8 G/DL (ref 6–8.5)
COMP. METABOLIC PANEL (14): SODIUM: 142 MMOL/L (ref 134–144)
FE+TIBC+FER: FERRITIN: 107 NG/ML (ref 15–150)
FE+TIBC+FER: IRON BIND.CAP.(TIBC): 394 UG/DL (ref 250–450)
FE+TIBC+FER: IRON SATURATION: 80 % — CRITICAL HIGH (ref 15–55)
FE+TIBC+FER: IRON: 316 UG/DL — CRITICAL HIGH (ref 27–159)
FE+TIBC+FER: UIBC: 78 UG/DL — LOW (ref 131–425)
PROTHROMBIN TIME (PT): INR: 1.1 (ref 0.9–1.2)
PROTHROMBIN TIME (PT): PROTHROMBIN TIME: 11.4 SEC (ref 9.1–12)

## 2022-08-30 PROCEDURE — 99214 OFFICE O/P EST MOD 30 MIN: CPT | Performed by: INTERNAL MEDICINE

## 2022-08-30 RX ORDER — PANTOPRAZOLE SODIUM 20 MG/1
20 TABLET, DELAYED RELEASE ORAL
Qty: 30 | Refills: 2 | Status: ACTIVE
Start: 2022-08-30

## 2022-08-30 RX ORDER — POLYETHYLENE GLYCOL 3350, SODIUM SULFATE, SODIUM CHLORIDE, POTASSIUM CHLORIDE, ASCORBIC ACID, SODIUM ASCORBATE 140-9-5.2G
KIT ORAL
Qty: 1 | Refills: 0 | Status: COMPLETED
Start: 2022-08-30 | End: 2023-04-11

## 2022-08-30 NOTE — SERVICEHPINOTES
Ms. Arroyo is a 40-year-old female here for follow-up of cirrhosis. The patient has done relatively well since we last saw her. She states that she ended up moving to [place] and was seen by [place], but in the interim has moved back to [place] is now followed by Dr. Rodriguez at the [place] liver transplant clinic. She has not had any alcohol in nearly one year and is doing AA. She states that she has been having issues of the right hip lately and is going to undergo surgery next week. She states that she has been cleared from a liver standpoint by Dr. Rodriguez. She is not taking any NSAIDs. She does note some mild swelling and has been taking spironolactone 50 mg once a day but does not taking the Lasix. She has not had any further bleeding since February 2022. She states that she is currently not in need of liver transplant based on her evaluation by Dr. Rodriguez. She is due for her next surveillance colonoscopy.Previous GI History:brThe patient was initially seen by me in August 2020 after being discharged from [facility]. She was previously followed by the [facility] GI group. She does have a history of heavy alcohol use and was in the hospital because of alcohol hepatitis prior to seeing me. She did well over that timeframe with abstinence and our last blood work in October 2020 revealed significant improvement in her liver enzymes. Her ascites also improved with diuretics. She does have microcytic anemia which has been stable. When we initially saw her she had mild elevation of AFP at 13.6 but with resolution of her hepatitis this came back to normal. She was seen by Dr. Riojas at [facility] for transplant evaluation. Since then the patient moved to [place], [place] and relapsed on alcohol use. She relapsed in January 2021. She was taken to the ED at [facility]. she did undergo inpatient rehab after her first relapse in January but then was admitted to [place] after her second relapse.She had not been seen since follow-up in February 2021. At that time her meld score was eight and she did have some mild normocytic anemia with hematocrit 30. We did recommend EGD and colonoscopy at that time but she never followed through. She was admitted to the hospital at [facility] in September 2021 because of severe anemia. She underwent EGD which revealed grade 2-3 esophageal varices with no stigmata of bleeding there is also portal hypertensive gastropathy but no definite gastric varices. There was a 1.5 cm clean based ulcer in the antrum. Biopsies of this were unremarkable and negative for H pylori. She then underwent colonoscopy in the hospital where she was found to have a large 2 cm sigmoid polyp which had some post polypectomy bleeding during the procedure and so underwent clipping and injection of epinephrine.. It is noted that the polyp was actually bleeding prior to polypectomy and was quite friable. There was another polyp in the sigmoid colon which was not removed. While in the hospital she had an episode of some hematemesis and passage of some blood clots and so underwent repeat EGD in flexible sigmoidoscopy where she had no evidence of any GI bleeding but there was some blood in the oropharynx consistent with possible nasal source. Flexible sigmoidoscopy revealed the polypectomy site was not bleeding with hemoclips still in place.

## 2022-08-30 NOTE — SERVICENOTES
Overall the patient does appear to be doing better from a liver standpoint.  She apparently had some issue with bleeding in February 2022 at [facility] so we will try to obtain these records.  It is uncertain if she has ever actually bled from her varices versus from an ulcer.  We will try to obtain these records been the meantime I did recommend she stay away from NSAIDs and only take Tylenol.  We will go ahead and put her on a low-dose PPI.  We will try to obtain records from [facility] to get a better idea of what happened.  She is due for next colonoscopy but this can wait until after her hip surgery.  I did instruct her task her orthopedic doctor when we can perform colonoscopy from their standpoint.  Also mentioned to her to let them know that if they recommend any antibiotic prophylaxis that they will need to prescribe this.  In the meantime she was instructed to notify us if he has any signs or symptoms of hepatic decompensation, bleeding, etc.  She is scheduled to see the liver transplant team next month. According to the patient she has already been cleared from a liver standpoint for her hip surgery.  I such I will defer to them in regard to clearance.  There clinic should also cover her bleeding that she had because they are the ones who took care of her in the hospital at that time.

## 2023-04-11 ENCOUNTER — OFFICE (OUTPATIENT)
Dept: URBAN - METROPOLITAN AREA CLINIC 12 | Facility: CLINIC | Age: 43
End: 2023-04-11
Payer: COMMERCIAL

## 2023-04-11 VITALS
HEIGHT: 69 IN | WEIGHT: 256 LBS | HEART RATE: 84 BPM | DIASTOLIC BLOOD PRESSURE: 80 MMHG | SYSTOLIC BLOOD PRESSURE: 139 MMHG | OXYGEN SATURATION: 100 %

## 2023-04-11 DIAGNOSIS — R18.8 OTHER ASCITES: ICD-10-CM

## 2023-04-11 DIAGNOSIS — R60.9 EDEMA, UNSPECIFIED: ICD-10-CM

## 2023-04-11 DIAGNOSIS — R14.0 ABDOMINAL DISTENSION (GASEOUS): ICD-10-CM

## 2023-04-11 DIAGNOSIS — K70.30 ALCOHOLIC CIRRHOSIS OF LIVER WITHOUT ASCITES: ICD-10-CM

## 2023-04-11 PROCEDURE — 99214 OFFICE O/P EST MOD 30 MIN: CPT | Performed by: INTERNAL MEDICINE

## 2023-04-11 RX ORDER — POLYETHYLENE GLYCOL 3350, SODIUM SULFATE, SODIUM CHLORIDE, POTASSIUM CHLORIDE, ASCORBIC ACID, SODIUM ASCORBATE 140-9-5.2G
KIT ORAL
Qty: 1 | Refills: 0 | Status: ACTIVE
Start: 2023-04-11

## 2023-04-11 RX ORDER — FUROSEMIDE 20 MG/1
40 TABLET ORAL
Qty: 60 | Refills: 6 | Status: ACTIVE
Start: 2022-09-01

## 2023-04-11 RX ORDER — SPIRONOLACTONE 50 MG/1
100 TABLET, FILM COATED ORAL
Qty: 60 | Refills: 6 | Status: ACTIVE
Start: 2020-08-24

## 2023-04-12 LAB
AFP, SERUM, TUMOR MARKER: 4.1 NG/ML (ref 0–6.4)
CBC, PLATELET, NO DIFFERENTIAL: HEMATOCRIT: 29.5 % — LOW (ref 34–46.6)
CBC, PLATELET, NO DIFFERENTIAL: HEMOGLOBIN: 8.6 G/DL — LOW (ref 11.1–15.9)
CBC, PLATELET, NO DIFFERENTIAL: MCH: 21.4 PG — LOW (ref 26.6–33)
CBC, PLATELET, NO DIFFERENTIAL: MCHC: 29.2 G/DL — LOW (ref 31.5–35.7)
CBC, PLATELET, NO DIFFERENTIAL: MCV: 73 FL — LOW (ref 79–97)
CBC, PLATELET, NO DIFFERENTIAL: PLATELETS: 116 X10E3/UL — LOW (ref 150–450)
CBC, PLATELET, NO DIFFERENTIAL: RBC: 4.02 X10E6/UL (ref 3.77–5.28)
CBC, PLATELET, NO DIFFERENTIAL: RDW: 18.4 % — HIGH (ref 11.7–15.4)
CBC, PLATELET, NO DIFFERENTIAL: WBC: 2.4 X10E3/UL — CRITICAL LOW (ref 3.4–10.8)
COMP. METABOLIC PANEL (14): A/G RATIO: 1.2 (ref 1.2–2.2)
COMP. METABOLIC PANEL (14): ALBUMIN: 3.8 G/DL (ref 3.8–4.8)
COMP. METABOLIC PANEL (14): ALKALINE PHOSPHATASE: 132 IU/L — HIGH (ref 44–121)
COMP. METABOLIC PANEL (14): ALT (SGPT): 16 IU/L (ref 0–32)
COMP. METABOLIC PANEL (14): AST (SGOT): 28 IU/L (ref 0–40)
COMP. METABOLIC PANEL (14): BILIRUBIN, TOTAL: 0.8 MG/DL (ref 0–1.2)
COMP. METABOLIC PANEL (14): BUN/CREATININE RATIO: 11 (ref 9–23)
COMP. METABOLIC PANEL (14): BUN: 9 MG/DL (ref 6–24)
COMP. METABOLIC PANEL (14): CALCIUM: 9.2 MG/DL (ref 8.7–10.2)
COMP. METABOLIC PANEL (14): CARBON DIOXIDE, TOTAL: 22 MMOL/L (ref 20–29)
COMP. METABOLIC PANEL (14): CHLORIDE: 108 MMOL/L — HIGH (ref 96–106)
COMP. METABOLIC PANEL (14): CREATININE: 0.85 MG/DL (ref 0.57–1)
COMP. METABOLIC PANEL (14): EGFR: 87 ML/MIN/1.73 (ref 59–?)
COMP. METABOLIC PANEL (14): GLOBULIN, TOTAL: 3.1 G/DL (ref 1.5–4.5)
COMP. METABOLIC PANEL (14): GLUCOSE: 103 MG/DL — HIGH (ref 70–99)
COMP. METABOLIC PANEL (14): POTASSIUM: 4.2 MMOL/L (ref 3.5–5.2)
COMP. METABOLIC PANEL (14): PROTEIN, TOTAL: 6.9 G/DL (ref 6–8.5)
COMP. METABOLIC PANEL (14): SODIUM: 140 MMOL/L (ref 134–144)
PROTHROMBIN TIME (PT): INR: 1.1 (ref 0.9–1.2)
PROTHROMBIN TIME (PT): PROTHROMBIN TIME: 11.3 SEC (ref 9.1–12)